# Patient Record
Sex: MALE | Race: WHITE | NOT HISPANIC OR LATINO | Employment: OTHER | URBAN - METROPOLITAN AREA
[De-identification: names, ages, dates, MRNs, and addresses within clinical notes are randomized per-mention and may not be internally consistent; named-entity substitution may affect disease eponyms.]

---

## 2017-10-11 ENCOUNTER — OFFICE VISIT (OUTPATIENT)
Dept: ORTHOPEDIC SURGERY | Facility: CLINIC | Age: 77
End: 2017-10-11

## 2017-10-11 VITALS
DIASTOLIC BLOOD PRESSURE: 83 MMHG | HEART RATE: 70 BPM | WEIGHT: 181 LBS | HEIGHT: 67 IN | SYSTOLIC BLOOD PRESSURE: 152 MMHG | BODY MASS INDEX: 28.41 KG/M2

## 2017-10-11 DIAGNOSIS — M17.12 PRIMARY OSTEOARTHRITIS OF LEFT KNEE: Primary | ICD-10-CM

## 2017-10-11 PROCEDURE — 99213 OFFICE O/P EST LOW 20 MIN: CPT | Performed by: ORTHOPAEDIC SURGERY

## 2017-10-11 NOTE — PROGRESS NOTES
[facility] [place] [facility] Clinic Note    Subjective     Chief Complaint   Patient presents with   • Left Knee - Pain     3 month follow up from [place], last seen 7/10/2017, patient had an injection at that time.         HPI    Valentino Prajapati is a 76 y.o. male. Presents today for his left knee.  He has a known history of arthritis.  Pain is intermittent, unchanged from his last visit, worsens with walking, standing, and climbing stairs.  It is associated with swelling.  The pain is sharp in quality, and moderate in severity.  Last injection provided brief relief.  That was a steroid injection.      Patient Active Problem List   Diagnosis   • Moderate obstructive sleep apnea   • Arthritis   • Essential hypertension   • Dyslipidemia   • Gout     Past Medical History:   Diagnosis Date   • Arthritis     left knee   • Deep vein thrombosis    • Essential hypertension 11/7/2016   • JESSICA on CPAP       Past Surgical History:   Procedure Laterality Date   • ELBOW PROCEDURE Left    • FRACTURE SURGERY     • VASECTOMY        Family History   Problem Relation Age of Onset   • Deep vein thrombosis Mother    • Bleeding Disorder Mother    • Deep vein thrombosis Child      Social History     Social History   • Marital status:      Spouse name: N/A   • Number of children: N/A   • Years of education: N/A     Occupational History   • Not on file.     Social History Main Topics   • Smoking status: Never Smoker   • Smokeless tobacco: Never Used   • Alcohol use No   • Drug use: No   • Sexual activity: Defer     Other Topics Concern   • Not on file     Social History Narrative      Current Outpatient Prescriptions on File Prior to Visit   Medication Sig Dispense Refill   • allopurinol (ZYLOPRIM) 100 MG tablet TK 1 T PO QD  1   • fenofibrate micronized (LOFIBRA) 134 MG capsule TK 1 C PO QD  1   • lisinopril (PRINIVIL,ZESTRIL) 10 MG tablet TK 1 T PO  QD  1   • sildenafil (REVATIO) 20 MG tablet Take 20-40 mg by mouth Take As Directed.      • tamsulosin (FLOMAX) 0.4 MG capsule 24 hr capsule TK 1 C PO QD  1   • warfarin (COUMADIN) 5 MG tablet TK 1 T PO D AS DIRECTED  0     No current facility-administered medications on file prior to visit.       Allergies   Allergen Reactions   • Penicillins         Review of Systems   Constitutional: Negative for activity change, appetite change, chills, diaphoresis, fatigue, fever and unexpected weight change.   HENT: Negative for congestion, dental problem, drooling, ear discharge, ear pain, facial swelling, hearing loss, mouth sores, nosebleeds, postnasal drip, rhinorrhea, sinus pressure, sneezing, sore throat, tinnitus, trouble swallowing and voice change.    Eyes: Negative for photophobia, pain, discharge, redness, itching and visual disturbance.   Respiratory: Negative for apnea, cough, choking, chest tightness, shortness of breath, wheezing and stridor.    Cardiovascular: Negative for chest pain, palpitations and leg swelling.   Gastrointestinal: Negative for abdominal distention, abdominal pain, anal bleeding, blood in stool, constipation, diarrhea, nausea, rectal pain and vomiting.   Endocrine: Negative for cold intolerance, heat intolerance, polydipsia, polyphagia and polyuria.   Genitourinary: Negative for decreased urine volume, difficulty urinating, dysuria, enuresis, flank pain, frequency, genital sores, hematuria and urgency.   Musculoskeletal: Positive for arthralgias. Negative for back pain, gait problem, joint swelling, myalgias, neck pain and neck stiffness.   Skin: Negative for color change, pallor, rash and wound.   Allergic/Immunologic: Negative for environmental allergies, food allergies and immunocompromised state.   Neurological: Negative for dizziness, tremors, seizures, syncope, facial asymmetry, speech difficulty, weakness, light-headedness, numbness and headaches.   Hematological: Negative for adenopathy. Does not bruise/bleed easily.   Psychiatric/Behavioral: Negative for agitation,  "behavioral problems, confusion, decreased concentration, dysphoric mood, hallucinations, self-injury, sleep disturbance and suicidal ideas. The patient is not nervous/anxious and is not hyperactive.         Objective      Physical Exam  /83  Pulse 70  Ht 67\" (170.2 cm)  Wt 181 lb (82.1 kg)  BMI 28.35 kg/m2    Body mass index is 28.35 kg/(m^2).    General:   Mental Status:  Alert   Appearance: Cooperative, in no acute distress   Build and Nutrition: Well-nourished and well developed male   Orientation: Alert and oriented to person, place and time   Posture: Normal   Gait: Normal    Integument:   Left knee: No skin lesions, no rash, no ecchymosis    Lower Extremities:   Left Knee:    Tenderness:  No medial or lateral joint line tenderness    Effusion:  None    Swelling:  None    Crepitus: Positive    Range of motion:  Extension: 0°       Flexion: 120°  Instability: No varus laxity, no valgus laxity, negative anterior drawer  Deformities:  Varus      Imaging/Studies  Previous radiographs were reviewed, which showed bone-on-bone contact medial compartment, with tricompartmental arthritic changes, and varus alignment.      Assessment and Plan     Valentino was seen today for pain.    Diagnoses and all orders for this visit:    Primary osteoarthritis of left knee        I reviewed my findings with patient today.  Symptoms are mild to moderate at current time, and he does have a clotting disorder, and certainly that would need to be taken into consideration should he be a surgical candidate in the future.  I will see him back there is any worsening or problems.  His knee is tolerable today.    Return if symptoms worsen or fail to improve, for as needed for any problems in the future.      Medical Decision Making  Data/Risk: independent visualization of imaging, lab tests, or EMG/NCV      Wenceslao Freire MD  10/11/17  10:02 AM  "

## 2018-06-20 ENCOUNTER — OFFICE VISIT (OUTPATIENT)
Dept: ORTHOPEDIC SURGERY | Facility: CLINIC | Age: 78
End: 2018-06-20

## 2018-06-20 VITALS — WEIGHT: 180.56 LBS | HEART RATE: 66 BPM | BODY MASS INDEX: 28.34 KG/M2 | HEIGHT: 67 IN | OXYGEN SATURATION: 99 %

## 2018-06-20 DIAGNOSIS — M17.12 PRIMARY OSTEOARTHRITIS OF LEFT KNEE: Primary | ICD-10-CM

## 2018-06-20 PROCEDURE — 99213 OFFICE O/P EST LOW 20 MIN: CPT | Performed by: ORTHOPAEDIC SURGERY

## 2018-06-20 NOTE — PROGRESS NOTES
[facility] [place] [facility] Note    Subjective     Chief Complaint   Patient presents with   • Left Knee - Follow-up     8 months        HPI    Valentino Prajapati is a 77 y.o. male. He presents today for evaluation of his left knee.  He has a known history of osteoarthritis, and is trying to avoid knee replacement surgery.  Minimal improvement with steroid injections in the past.  He is interested in Visco supplementation injections at this time.  He has moderate to severe pain in the left knee, associated with swelling and stiffness, which is burning and sharp in quality.  It worsens with walking, standing and climbing stairs.      Patient Active Problem List   Diagnosis   • Moderate obstructive sleep apnea   • Arthritis   • Essential hypertension   • Dyslipidemia   • Gout   • Primary osteoarthritis of left knee     Past Medical History:   Diagnosis Date   • Arthritis     left knee   • Deep vein thrombosis    • Essential hypertension 11/7/2016   • JESSICA on CPAP       Past Surgical History:   Procedure Laterality Date   • ELBOW PROCEDURE Left    • FRACTURE SURGERY     • VASECTOMY        Family History   Problem Relation Age of Onset   • Deep vein thrombosis Mother    • Bleeding Disorder Mother    • Deep vein thrombosis Child      Social History     Social History   • Marital status:      Spouse name: N/A   • Number of children: N/A   • Years of education: N/A     Occupational History   • Not on file.     Social History Main Topics   • Smoking status: Never Smoker   • Smokeless tobacco: Never Used   • Alcohol use No   • Drug use: No   • Sexual activity: Defer     Other Topics Concern   • Not on file     Social History Narrative   • No narrative on file      Current Outpatient Prescriptions on File Prior to Visit   Medication Sig Dispense Refill   • allopurinol (ZYLOPRIM) 100 MG tablet TK 1 T PO QD  1   • fenofibrate micronized (LOFIBRA) 134 MG capsule TK 1 C PO QD  1   • lisinopril (PRINIVIL,ZESTRIL) 10 MG  "tablet TK 1 T PO  QD  1   • sildenafil (REVATIO) 20 MG tablet Take 20-40 mg by mouth Take As Directed.     • tamsulosin (FLOMAX) 0.4 MG capsule 24 hr capsule TK 1 C PO QD  1   • warfarin (COUMADIN) 5 MG tablet TK 1 T PO D AS DIRECTED  0     No current facility-administered medications on file prior to visit.       Allergies   Allergen Reactions   • Penicillins         Review of Systems   Constitutional: Negative.    HENT: Positive for sinus pressure.    Eyes: Negative.    Respiratory: Negative.    Cardiovascular: Negative.    Gastrointestinal: Negative.    Endocrine: Negative.    Genitourinary: Negative.    Musculoskeletal: Positive for arthralgias.   Skin: Negative.    Allergic/Immunologic: Positive for environmental allergies.   Neurological: Negative.    Hematological: Bruises/bleeds easily.   Psychiatric/Behavioral: Negative.         Objective      Physical Exam  Pulse 66   Ht 170.2 cm (67.01\")   Wt 81.9 kg (180 lb 8.9 oz)   SpO2 99%   BMI 28.27 kg/m²     Body mass index is 28.27 kg/m².    General:   Mental Status:  Alert   Appearance: Cooperative, in no acute distress   Build and Nutrition: Well-nourished and well developed male   Orientation: Alert and oriented to person, place and time   Posture: Normal   Gait: Normal    Integument:   Left knee: No skin lesions, no rash, no ecchymosis    Lower Extremities:   Left Knee:    Tenderness:  None    Effusion:  None    Swelling:  None    Crepitus: Positive    Range of motion:  Extension: 5°       Flexion: 120°  Instability: No varus laxity, no valgus laxity, negative anterior drawer  Deformities:  Varus        Imaging/Studies  Imaging Results (last 24 hours)     Procedure Component Value Units Date/Time    XR Knee 4+ View Left [[id number]] Resulted:  06/20/18 0923     Updated:  06/20/18 0924    Narrative:       Left Knee Radiographs  Indication: left knee pain  Views: Standing AP's and skiers of both knees, with lateral and sunrise   views of the left " knee    Comparison: no prior studies available    Findings:   Bone-on-bone contact medial compartment, with varus alignment, and   tricompartmental osteophytes, significant patellofemoral degenerative   changes.    Impression: Advanced left knee arthritis.            Assessment and Plan     Valentino was seen today for follow-up.    Diagnoses and all orders for this visit:    Primary osteoarthritis of left knee  -     XR Knee 4+ View Left  -     Large Joint Arthrocentesis        I reviewed my findings with patient today.  He has advanced left knee arthritis, but has a significant clotting disorder.  He is not interested in surgical management at this time.  He would like to try a Visco supplementation injection series.  I discussed that when he has advanced arthritis with bone-on-bone contact, these do not work as well as if he did not.  However, because our options are limited, he would like to go ahead and proceed.  We will submit for approval.  I will see him back once the injections are ready for administration.    Return for After approval of the visco injection.      Medical Decision Making  Management Options : prescription/IM medicine  Data/Risk: radiology tests and independent visualization of imaging, lab tests, or EMG/NCV      Wenceslao Freire MD  06/20/18  9:33 AM

## 2018-07-18 ENCOUNTER — CLINICAL SUPPORT (OUTPATIENT)
Dept: ORTHOPEDIC SURGERY | Facility: CLINIC | Age: 78
End: 2018-07-18

## 2018-07-18 DIAGNOSIS — M17.12 PRIMARY OSTEOARTHRITIS OF LEFT KNEE: Primary | ICD-10-CM

## 2018-07-18 PROCEDURE — 20610 DRAIN/INJ JOINT/BURSA W/O US: CPT | Performed by: ORTHOPAEDIC SURGERY

## 2018-07-18 NOTE — PROGRESS NOTES
Procedure   Large Joint Arthrocentesis  Date/Time: 7/18/2018 9:26 AM  Consent given by: patient  Site marked: site marked  Timeout: Immediately prior to procedure a time out was called to verify the correct patient, procedure, equipment, support staff and site/side marked as required   Supporting Documentation  Indications: pain   Procedure Details  Location: knee - L knee  Preparation: Patient was prepped and draped in the usual sterile fashion  Needle size: 22 G  Approach: anterolateral  Medications administered: 30 mg Hyaluronan 30 MG/2ML  Patient tolerance: patient tolerated the procedure well with no immediate complications

## 2018-07-18 NOTE — PROGRESS NOTES
Procedure   Large Joint Arthrocentesis  Date/Time: 7/18/2018 9:00 AM  Consent given by: patient  Site marked: site marked  Timeout: Immediately prior to procedure a time out was called to verify the correct patient, procedure, equipment, support staff and site/side marked as required   Supporting Documentation  Indications: pain   Procedure Details  Location: knee - L knee  Preparation: Patient was prepped and draped in the usual sterile fashion  Needle size: 22 G  Approach: anterolateral  Medications administered: 30 mg Hyaluronan 30 MG/2ML  Patient tolerance: patient tolerated the procedure well with no immediate complications

## 2018-07-18 NOTE — PROGRESS NOTES
[facility] [place] [facility] Note    Subjective     Chief Complaint   Patient presents with   • Left Knee - Follow-up     Orthovisc injection #1        HPI    Valentino Prajapati is a 77 y.o. male who presents for the first Orthovisc injection into the left knee joint.    Patient Active Problem List   Diagnosis   • Moderate obstructive sleep apnea   • Arthritis   • Essential hypertension   • Dyslipidemia   • Gout   • Primary osteoarthritis of left knee     Past Medical History:   Diagnosis Date   • Arthritis     left knee   • Deep vein thrombosis (CMS/HCC)    • Essential hypertension 11/7/2016   • JESSICA on CPAP       Past Surgical History:   Procedure Laterality Date   • ELBOW PROCEDURE Left    • FRACTURE SURGERY     • VASECTOMY        Family History   Problem Relation Age of Onset   • Deep vein thrombosis Mother    • Bleeding Disorder Mother    • Deep vein thrombosis Child      Social History     Social History   • Marital status:      Spouse name: N/A   • Number of children: N/A   • Years of education: N/A     Occupational History   • Not on file.     Social History Main Topics   • Smoking status: Never Smoker   • Smokeless tobacco: Never Used   • Alcohol use No   • Drug use: No   • Sexual activity: Defer     Other Topics Concern   • Not on file     Social History Narrative   • No narrative on file      Current Outpatient Prescriptions on File Prior to Visit   Medication Sig Dispense Refill   • allopurinol (ZYLOPRIM) 100 MG tablet TK 1 T PO QD  1   • fenofibrate micronized (LOFIBRA) 134 MG capsule TK 1 C PO QD  1   • lisinopril (PRINIVIL,ZESTRIL) 10 MG tablet TK 1 T PO  QD  1   • sildenafil (REVATIO) 20 MG tablet Take 20-40 mg by mouth Take As Directed.     • tamsulosin (FLOMAX) 0.4 MG capsule 24 hr capsule TK 1 C PO QD  1   • warfarin (COUMADIN) 5 MG tablet TK 1 T PO D AS DIRECTED  0     No current facility-administered medications on file prior to visit.       Allergies   Allergen Reactions   •  Penicillins         Review of Systems   Constitutional: Negative for activity change, appetite change, chills, diaphoresis, fatigue, fever and unexpected weight change.   HENT: Negative for congestion, dental problem, drooling, ear discharge, ear pain, facial swelling, hearing loss, mouth sores, nosebleeds, postnasal drip, rhinorrhea, sinus pressure, sneezing, sore throat, tinnitus, trouble swallowing and voice change.    Eyes: Negative for photophobia, pain, discharge, redness, itching and visual disturbance.   Respiratory: Negative for apnea, cough, choking, chest tightness, shortness of breath, wheezing and stridor.    Cardiovascular: Negative for chest pain, palpitations and leg swelling.   Gastrointestinal: Negative for abdominal distention, abdominal pain, anal bleeding, blood in stool, constipation, diarrhea, nausea, rectal pain and vomiting.   Endocrine: Negative for cold intolerance, heat intolerance, polydipsia, polyphagia and polyuria.   Genitourinary: Negative for decreased urine volume, difficulty urinating, dysuria, enuresis, flank pain, frequency, genital sores, hematuria and urgency.   Musculoskeletal: Positive for arthralgias. Negative for back pain, gait problem, joint swelling, myalgias, neck pain and neck stiffness.   Skin: Negative for color change, pallor, rash and wound.   Allergic/Immunologic: Negative for environmental allergies, food allergies and immunocompromised state.   Neurological: Negative for dizziness, tremors, seizures, syncope, facial asymmetry, speech difficulty, weakness, light-headedness, numbness and headaches.   Hematological: Negative for adenopathy. Does not bruise/bleed easily.   Psychiatric/Behavioral: Negative for agitation, behavioral problems, confusion, decreased concentration, dysphoric mood, hallucinations, self-injury, sleep disturbance and suicidal ideas. The patient is not nervous/anxious and is not hyperactive.         Objective      Physical Exam  There were  no vitals taken for this visit.    There is no height or weight on file to calculate BMI.    General:   Mental Status:  Alert   Appearance: Cooperative, in no acute distress   Posture: Normal    Assessment and Plan     Valentino was seen today for follow-up.    Diagnoses and all orders for this visit:    Primary osteoarthritis of left knee        Administration of viscosupplementation today.  Please see my procedure note for details.    Return in about 1 week (around 7/25/2018) for Injection.    Wenceslao Freire MD  07/18/18  9:35 AM

## 2018-07-25 ENCOUNTER — CLINICAL SUPPORT (OUTPATIENT)
Dept: ORTHOPEDIC SURGERY | Facility: CLINIC | Age: 78
End: 2018-07-25

## 2018-07-25 DIAGNOSIS — M17.12 PRIMARY OSTEOARTHRITIS OF LEFT KNEE: Primary | ICD-10-CM

## 2018-07-25 PROCEDURE — 20610 DRAIN/INJ JOINT/BURSA W/O US: CPT | Performed by: ORTHOPAEDIC SURGERY

## 2018-07-25 NOTE — PROGRESS NOTES
[facility] [place] Orthopa[facility] Surgery Clinic Note    Subjective     Chief Complaint   Patient presents with   • Left Knee - Follow-up        HPI    Valentino Prajapati is a 77 y.o. male who presents for the second Orthovisc injection into the left knee joint.  Of note, he has seen some relief at this point.    Patient Active Problem List   Diagnosis   • Moderate obstructive sleep apnea   • Arthritis   • Essential hypertension   • Dyslipidemia   • Gout   • Primary osteoarthritis of left knee     Past Medical History:   Diagnosis Date   • Arthritis     left knee   • Deep vein thrombosis (CMS/HCC)    • Essential hypertension 11/7/2016   • JESSICA on CPAP       Past Surgical History:   Procedure Laterality Date   • ELBOW PROCEDURE Left    • FRACTURE SURGERY     • VASECTOMY        Family History   Problem Relation Age of Onset   • Deep vein thrombosis Mother    • Bleeding Disorder Mother    • Deep vein thrombosis Child      Social History     Social History   • Marital status:      Spouse name: N/A   • Number of children: N/A   • Years of education: N/A     Occupational History   • Not on file.     Social History Main Topics   • Smoking status: Never Smoker   • Smokeless tobacco: Never Used   • Alcohol use No   • Drug use: No   • Sexual activity: Defer     Other Topics Concern   • Not on file     Social History Narrative   • No narrative on file      Current Outpatient Prescriptions on File Prior to Visit   Medication Sig Dispense Refill   • allopurinol (ZYLOPRIM) 100 MG tablet TK 1 T PO QD  1   • fenofibrate micronized (LOFIBRA) 134 MG capsule TK 1 C PO QD  1   • lisinopril (PRINIVIL,ZESTRIL) 10 MG tablet TK 1 T PO  QD  1   • sildenafil (REVATIO) 20 MG tablet Take 20-40 mg by mouth Take As Directed.     • tamsulosin (FLOMAX) 0.4 MG capsule 24 hr capsule TK 1 C PO QD  1   • warfarin (COUMADIN) 5 MG tablet TK 1 T PO D AS DIRECTED  0     No current facility-administered medications on file prior to visit.       Allergies   Allergen  Reactions   • Penicillins         Review of Systems   Constitutional: Negative.    HENT: Negative.    Eyes: Negative.    Respiratory: Negative.    Cardiovascular: Negative.    Gastrointestinal: Negative.    Endocrine: Negative.    Genitourinary: Negative.    Musculoskeletal: Positive for arthralgias.   Skin: Negative.    Allergic/Immunologic: Negative.    Neurological: Negative.    Hematological: Negative.    Psychiatric/Behavioral: Negative.         Objective      Physical Exam  There were no vitals taken for this visit.    There is no height or weight on file to calculate BMI.    General:   Mental Status:  Alert   Appearance: Cooperative, in no acute distress   Posture: Normal    Assessment and Plan     Valentino was seen today for follow-up.    Diagnoses and all orders for this visit:    Primary osteoarthritis of left knee  -     Large Joint Arthrocentesis        Administration of viscosupplementation today.  Please see my procedure note for details.    Return in about 1 week (around 8/1/2018) for Injection.    Wenceslao Freire MD  07/25/18  9:04 AM

## 2018-07-25 NOTE — PROGRESS NOTES
Procedure   Large Joint Arthrocentesis  Date/Time: 7/25/2018 8:51 AM  Consent given by: patient  Timeout: Immediately prior to procedure a time out was called to verify the correct patient, procedure, equipment, support staff and site/side marked as required   Supporting Documentation  Indications: pain   Procedure Details  Location: knee - L knee  Preparation: Patient was prepped and draped in the usual sterile fashion  Needle size: 22 G  Approach: anterolateral  Medications administered: 30 mg Hyaluronan 30 MG/2ML

## 2018-08-08 ENCOUNTER — CLINICAL SUPPORT (OUTPATIENT)
Dept: ORTHOPEDIC SURGERY | Facility: CLINIC | Age: 78
End: 2018-08-08

## 2018-08-08 DIAGNOSIS — M17.12 PRIMARY OSTEOARTHRITIS OF LEFT KNEE: Primary | ICD-10-CM

## 2018-08-08 PROCEDURE — 20610 DRAIN/INJ JOINT/BURSA W/O US: CPT | Performed by: ORTHOPAEDIC SURGERY

## 2018-08-08 NOTE — PROGRESS NOTES
[facility] [place] [facility] [place]    Subjective     Chief Complaint   Patient presents with   • Left Knee - Follow-up     Primary Osteoarthritis of Left Knee; Orthovisc Injection #3        HPI    Valentino Prajapati is a 77 y.o. male who presents for the third Orthovisc injection into the left knee joint.  Of note, he has seen some improvement so far.    Patient Active Problem List   Diagnosis   • Moderate obstructive sleep apnea   • Arthritis   • Essential hypertension   • Dyslipidemia   • Gout   • Primary osteoarthritis of left knee     Past Medical History:   Diagnosis Date   • Arthritis     left knee   • Deep vein thrombosis (CMS/HCC)    • Essential hypertension 11/7/2016   • JESSICA on CPAP       Past Surgical History:   Procedure Laterality Date   • ELBOW PROCEDURE Left    • FRACTURE SURGERY     • VASECTOMY        Family History   Problem Relation Age of Onset   • Deep vein thrombosis Mother    • Bleeding Disorder Mother    • Deep vein thrombosis Child      Social History     Social History   • Marital status:      Spouse name: N/A   • Number of children: N/A   • Years of education: N/A     Occupational History   • Not on file.     Social History Main Topics   • Smoking status: Never Smoker   • Smokeless tobacco: Never Used   • Alcohol use No   • Drug use: No   • Sexual activity: Defer     Other Topics Concern   • Not on file     Social History Narrative   • No narrative on file      Current Outpatient Prescriptions on File Prior to Visit   Medication Sig Dispense Refill   • allopurinol (ZYLOPRIM) 100 MG tablet TK 1 T PO QD  1   • fenofibrate micronized (LOFIBRA) 134 MG capsule TK 1 C PO QD  1   • lisinopril (PRINIVIL,ZESTRIL) 10 MG tablet TK 1 T PO  QD  1   • sildenafil (REVATIO) 20 MG tablet Take 20-40 mg by mouth Take As Directed.     • tamsulosin (FLOMAX) 0.4 MG capsule 24 hr capsule TK 1 C PO QD  1   • warfarin (COUMADIN) 5 MG tablet TK 1 T PO D AS DIRECTED  0     No current facility-administered  medications on file prior to visit.       Allergies   Allergen Reactions   • Penicillins         Review of Systems   Constitutional: Negative for activity change, appetite change, chills, diaphoresis, fatigue, fever and unexpected weight change.   HENT: Negative for congestion, dental problem, drooling, ear discharge, ear pain, facial swelling, hearing loss, mouth sores, nosebleeds, postnasal drip, rhinorrhea, sinus pressure, sneezing, sore throat, tinnitus, trouble swallowing and voice change.    Eyes: Negative for photophobia, pain, discharge, redness, itching and visual disturbance.   Respiratory: Negative for apnea, cough, choking, chest tightness, shortness of breath, wheezing and stridor.    Cardiovascular: Negative for chest pain, palpitations and leg swelling.   Gastrointestinal: Negative for abdominal distention, abdominal pain, anal bleeding, blood in stool, constipation, diarrhea, nausea, rectal pain and vomiting.   Endocrine: Negative for cold intolerance, heat intolerance, polydipsia, polyphagia and polyuria.   Genitourinary: Negative for decreased urine volume, difficulty urinating, dysuria, enuresis, flank pain, frequency, genital sores, hematuria and urgency.   Musculoskeletal: Positive for joint swelling. Negative for arthralgias, back pain, gait problem, myalgias, neck pain and neck stiffness.   Skin: Negative for color change, pallor, rash and wound.   Allergic/Immunologic: Negative for environmental allergies, food allergies and immunocompromised state.   Neurological: Negative for dizziness, tremors, seizures, syncope, facial asymmetry, speech difficulty, weakness, light-headedness, numbness and headaches.   Hematological: Negative for adenopathy. Does not bruise/bleed easily.   Psychiatric/Behavioral: Negative for agitation, behavioral problems, confusion, decreased concentration, dysphoric mood, hallucinations, self-injury, sleep disturbance and suicidal ideas. The patient is not  nervous/anxious and is not hyperactive.         Objective      Physical Exam  There were no vitals taken for this visit.    There is no height or weight on file to calculate BMI.    General:   Mental Status:  Alert   Appearance: Cooperative, in no acute distress   Posture: Normal    Assessment and Plan     Valentino was seen today for follow-up.    Diagnoses and all orders for this visit:    Primary osteoarthritis of left knee  -     Large Joint Arthrocentesis        Administration of viscosupplementation today.  Please see my procedure note for details.    Return in about 6 months (around 2/8/2019).    Wenceslao Freire MD  08/08/18  9:03 AM

## 2018-08-08 NOTE — PROGRESS NOTES
Procedure   Large Joint Arthrocentesis  Date/Time: 8/8/2018 8:54 AM  Consent given by: patient  Site marked: site marked  Timeout: Immediately prior to procedure a time out was called to verify the correct patient, procedure, equipment, support staff and site/side marked as required   Supporting Documentation  Indications: pain   Procedure Details  Location: knee - L knee  Preparation: Patient was prepped and draped in the usual sterile fashion  Needle size: 22 G  Approach: anterolateral  Medications administered: 30 mg Hyaluronan 30 MG/2ML  Patient tolerance: patient tolerated the procedure well with no immediate complications

## 2019-02-11 ENCOUNTER — OFFICE VISIT (OUTPATIENT)
Dept: ORTHOPEDIC SURGERY | Facility: CLINIC | Age: 79
End: 2019-02-11

## 2019-02-11 VITALS — HEART RATE: 76 BPM | WEIGHT: 185.19 LBS | OXYGEN SATURATION: 98 % | HEIGHT: 67 IN | BODY MASS INDEX: 29.07 KG/M2

## 2019-02-11 DIAGNOSIS — M17.12 PRIMARY OSTEOARTHRITIS OF LEFT KNEE: Primary | ICD-10-CM

## 2019-02-11 PROCEDURE — 99213 OFFICE O/P EST LOW 20 MIN: CPT | Performed by: ORTHOPAEDIC SURGERY

## 2019-02-11 RX ORDER — WARFARIN SODIUM 3 MG/1
3 TABLET ORAL DAILY
Refills: 0 | COMMUNITY
Start: 2018-12-27

## 2019-02-11 RX ORDER — LISINOPRIL 20 MG/1
20 TABLET ORAL DAILY
Refills: 1 | COMMUNITY
Start: 2018-12-07

## 2019-02-11 NOTE — PROGRESS NOTES
[facility] [place] [facility] [place]    Subjective     Chief Complaint   Patient presents with   • Follow-up     6 months following visco injection - left knee         HPI    Valentino Prajapati is a 78 y.o. male.  He follows up today for his left knee.  He has been experiencing pain for 5 years and the left knee, it overall is improved following the Visco supplementation injections last year, but is starting to wear off, and he would like repeat injections if possible.  The pain is worse with walking, standing and climbing stairs.  The pain is currently 5 out of 10, aching in quality.  He has a clotting disorder, and is not interested in surgical intervention.      Patient Active Problem List   Diagnosis   • Moderate obstructive sleep apnea   • Arthritis   • Essential hypertension   • Dyslipidemia   • Gout   • Primary osteoarthritis of left knee     Past Medical History:   Diagnosis Date   • Arthritis     left knee   • Deep vein thrombosis (CMS/HCC)    • Essential hypertension 11/7/2016   • JESSICA on CPAP       Past Surgical History:   Procedure Laterality Date   • ELBOW PROCEDURE Left    • FRACTURE SURGERY     • VASECTOMY        Family History   Problem Relation Age of Onset   • Deep vein thrombosis Mother    • Bleeding Disorder Mother    • Deep vein thrombosis Child      Social History     Socioeconomic History   • Marital status:      Spouse name: Not on file   • Number of children: Not on file   • Years of education: Not on file   • Highest education level: Not on file   Social Needs   • Financial resource strain: Not on file   • Food insecurity - worry: Not on file   • Food insecurity - inability: Not on file   • Transportation needs - medical: Not on file   • Transportation needs - non-medical: Not on file   Occupational History   • Not on file   Tobacco Use   • Smoking status: Never Smoker   • Smokeless tobacco: Never Used   Substance and Sexual Activity   • Alcohol use: No   • Drug use: No   • Sexual  activity: Defer   Other Topics Concern   • Not on file   Social History Narrative   • Not on file      Current Outpatient Medications on File Prior to Visit   Medication Sig Dispense Refill   • allopurinol (ZYLOPRIM) 100 MG tablet TK 1 T PO QD  1   • fenofibrate micronized (LOFIBRA) 134 MG capsule TK 1 C PO QD  1   • lisinopril (PRINIVIL,ZESTRIL) 20 MG tablet Take 20 mg by mouth Daily.  1   • sildenafil (REVATIO) 20 MG tablet Take 20-40 mg by mouth Take As Directed.     • tamsulosin (FLOMAX) 0.4 MG capsule 24 hr capsule TK 1 C PO QD  1   • warfarin (COUMADIN) 3 MG tablet Take 3 mg by mouth Daily.  0   • [DISCONTINUED] lisinopril (PRINIVIL,ZESTRIL) 10 MG tablet TK 1 T PO  QD  1   • [DISCONTINUED] warfarin (COUMADIN) 5 MG tablet TK 1 T PO D AS DIRECTED  0     No current facility-administered medications on file prior to visit.       Allergies   Allergen Reactions   • Penicillins         Review of Systems   Constitutional: Negative for activity change, appetite change, chills, diaphoresis, fatigue, fever and unexpected weight change.   HENT: Negative for congestion, dental problem, drooling, ear discharge, ear pain, facial swelling, hearing loss, mouth sores, nosebleeds, postnasal drip, rhinorrhea, sinus pressure, sneezing, sore throat, tinnitus, trouble swallowing and voice change.    Eyes: Negative for photophobia, pain, discharge, redness, itching and visual disturbance.   Respiratory: Negative for apnea, cough, choking, chest tightness, shortness of breath, wheezing and stridor.    Cardiovascular: Negative for chest pain, palpitations and leg swelling.   Gastrointestinal: Negative for abdominal distention, abdominal pain, anal bleeding, blood in stool, constipation, diarrhea, nausea, rectal pain and vomiting.   Endocrine: Negative for cold intolerance, heat intolerance, polydipsia, polyphagia and polyuria.   Genitourinary: Negative for decreased urine volume, difficulty urinating, dysuria, enuresis, flank pain,  "frequency, genital sores, hematuria and urgency.   Musculoskeletal: Negative for arthralgias, back pain, gait problem, joint swelling, myalgias, neck pain and neck stiffness.        Primary osteoarthritis of left knee      Skin: Negative for color change, pallor, rash and wound.   Allergic/Immunologic: Negative for environmental allergies, food allergies and immunocompromised state.   Neurological: Negative for dizziness, tremors, seizures, syncope, facial asymmetry, speech difficulty, weakness, light-headedness, numbness and headaches.   Hematological: Negative for adenopathy. Does not bruise/bleed easily.   Psychiatric/Behavioral: Negative for agitation, behavioral problems, confusion, decreased concentration, dysphoric mood, hallucinations, self-injury, sleep disturbance and suicidal ideas. The patient is not nervous/anxious and is not hyperactive.         Objective      Physical Exam  Pulse 76   Ht 170.2 cm (67.01\")   Wt 84 kg (185 lb 3 oz)   SpO2 98%   BMI 29.00 kg/m²     Body mass index is 29 kg/m².    General:   Mental Status:  Alert   Appearance: Cooperative, in no acute distress   Build and Nutrition: Well-nourished and well developed male   Orientation: Alert and oriented to person, place and time   Posture: Normal   Gait: Normal    Integument:   Left knee: No skin lesions, no rash, no ecchymosis    Lower Extremities:   Left Knee:    Tenderness:  None    Effusion:  None    Swelling:  None    Crepitus: Positive    Range of motion:  Extension: 0°       Flexion: 120°  Instability: No varus laxity, no valgus laxity, negative anterior drawer  Deformities:  Varus        Imaging/Studies  Imaging Results (last 24 hours)     Procedure Component Value Units Date/Time    XR Knee 4+ View Left [[id number]] Resulted:  02/11/19 0921     Updated:  02/11/19 0922    Narrative:       Left Knee Radiographs  Indication: left knee pain  Views: Standing AP's and skiers of both knees, with lateral and sunrise   views of the " left knee    Comparison: 6/20/2018    Findings:   Arthritic changes in the left knee, with bone-on-bone contact medial   compartment, tricompartmental osteophytes, varus alignment, and advanced   patellofemoral degeneration, no significant changes compared to previous   films.            Assessment and Plan     Valentino was seen today for follow-up.    Diagnoses and all orders for this visit:    Primary osteoarthritis of left knee  -     XR Knee 4+ View Left  -     Large Joint Arthrocentesis        I reviewed my findings with patient today.  His left knee continues to bother him, although he did have good relief with the Visco supplementation injections.  He would like a repeat series, and we will submit for approval.  He has a clotting disorder, and is not interested in surgical intervention.    Return for After approval of the visco injection.      Medical Decision Making  Management Options : prescription/IM medicine  Data/Risk: radiology tests and independent visualization of imaging, lab tests, or EMG/NCV      Wenceslao Freire MD  02/11/19  9:29 AM

## 2019-02-20 ENCOUNTER — CLINICAL SUPPORT (OUTPATIENT)
Dept: ORTHOPEDIC SURGERY | Facility: CLINIC | Age: 79
End: 2019-02-20

## 2019-02-20 DIAGNOSIS — M17.12 PRIMARY OSTEOARTHRITIS OF LEFT KNEE: Primary | ICD-10-CM

## 2019-02-20 PROCEDURE — 20610 DRAIN/INJ JOINT/BURSA W/O US: CPT | Performed by: ORTHOPAEDIC SURGERY

## 2019-02-20 NOTE — PROGRESS NOTES
Procedure   Large Joint Arthrocentesis: L knee  Date/Time: 2/20/2019 10:00 AM  Consent given by: patient  Site marked: site marked  Timeout: Immediately prior to procedure a time out was called to verify the correct patient, procedure, equipment, support staff and site/side marked as required   Supporting Documentation  Indications: pain   Procedure Details  Location: knee - L knee  Preparation: Patient was prepped and draped in the usual sterile fashion  Needle size: 22 G  Approach: anterolateral  Medications administered: 30 mg Hyaluronan 30 MG/2ML  Patient tolerance: patient tolerated the procedure well with no immediate complications

## 2019-02-20 NOTE — PROGRESS NOTES
[facility] [place] [facility] [place]    Subjective     Chief Complaint   Patient presents with   • Injections     Left knee Orthovisc injection #1        HPI    Valentino Prajapati is a 78 y.o. male who presents for the first Orthovisc injection into the left knee.    Patient Active Problem List   Diagnosis   • Moderate obstructive sleep apnea   • Arthritis   • Essential hypertension   • Dyslipidemia   • Gout   • Primary osteoarthritis of left knee     Past Medical History:   Diagnosis Date   • Arthritis     left knee   • Deep vein thrombosis (CMS/HCC)    • Essential hypertension 11/7/2016   • JESSICA on CPAP       Past Surgical History:   Procedure Laterality Date   • ELBOW PROCEDURE Left    • FRACTURE SURGERY     • VASECTOMY        Family History   Problem Relation Age of Onset   • Deep vein thrombosis Mother    • Bleeding Disorder Mother    • Deep vein thrombosis Child      Social History     Socioeconomic History   • Marital status:      Spouse name: Not on file   • Number of children: Not on file   • Years of education: Not on file   • Highest education level: Not on file   Social Needs   • Financial resource strain: Not on file   • Food insecurity - worry: Not on file   • Food insecurity - inability: Not on file   • Transportation needs - medical: Not on file   • Transportation needs - non-medical: Not on file   Occupational History   • Not on file   Tobacco Use   • Smoking status: Never Smoker   • Smokeless tobacco: Never Used   Substance and Sexual Activity   • Alcohol use: No   • Drug use: No   • Sexual activity: Defer   Other Topics Concern   • Not on file   Social History Narrative   • Not on file      Current Outpatient Medications on File Prior to Visit   Medication Sig Dispense Refill   • allopurinol (ZYLOPRIM) 100 MG tablet TK 1 T PO QD  1   • fenofibrate micronized (LOFIBRA) 134 MG capsule TK 1 C PO QD  1   • lisinopril (PRINIVIL,ZESTRIL) 20 MG tablet Take 20 mg by mouth Daily.  1   • sildenafil  (REVATIO) 20 MG tablet Take 20-40 mg by mouth Take As Directed.     • tamsulosin (FLOMAX) 0.4 MG capsule 24 hr capsule TK 1 C PO QD  1   • warfarin (COUMADIN) 3 MG tablet Take 3 mg by mouth Daily.  0     No current facility-administered medications on file prior to visit.       Allergies   Allergen Reactions   • Penicillins         Review of Systems   Constitutional: Negative for activity change, appetite change, chills, diaphoresis, fatigue, fever and unexpected weight change.   HENT: Negative for congestion, dental problem, drooling, ear discharge, ear pain, facial swelling, hearing loss, mouth sores, nosebleeds, postnasal drip, rhinorrhea, sinus pressure, sneezing, sore throat, tinnitus, trouble swallowing and voice change.    Eyes: Negative for photophobia, pain, discharge, redness, itching and visual disturbance.   Respiratory: Negative for apnea, cough, choking, chest tightness, shortness of breath, wheezing and stridor.    Cardiovascular: Negative for chest pain, palpitations and leg swelling.   Gastrointestinal: Negative for abdominal distention, abdominal pain, anal bleeding, blood in stool, constipation, diarrhea, nausea, rectal pain and vomiting.   Endocrine: Negative for cold intolerance, heat intolerance, polydipsia, polyphagia and polyuria.   Genitourinary: Negative for decreased urine volume, difficulty urinating, dysuria, enuresis, flank pain, frequency, genital sores, hematuria and urgency.   Musculoskeletal: Positive for arthralgias (Knee pain). Negative for back pain, gait problem, joint swelling, myalgias, neck pain and neck stiffness.   Skin: Negative for color change, pallor, rash and wound.   Allergic/Immunologic: Negative for environmental allergies, food allergies and immunocompromised state.   Neurological: Negative for dizziness, tremors, seizures, syncope, facial asymmetry, speech difficulty, weakness, light-headedness, numbness and headaches.   Hematological: Negative for adenopathy.  Does not bruise/bleed easily.   Psychiatric/Behavioral: Negative for agitation, behavioral problems, confusion, decreased concentration, dysphoric mood, hallucinations, self-injury, sleep disturbance and suicidal ideas. The patient is not nervous/anxious and is not hyperactive.         Objective      Physical Exam  There were no vitals taken for this visit.    There is no height or weight on file to calculate BMI.    General:   Mental Status:  Alert   Appearance: Cooperative, in no acute distress   Posture: Normal    Assessment and Plan     Valentino was seen today for injections.    Diagnoses and all orders for this visit:    Primary osteoarthritis of left knee    Large Joint Arthrocentesis: L knee    Administration of viscosupplementation today.  Please see my procedure note for details.    Return in about 1 week (around 2/27/2019) for Injection.    Wenceslao Freire MD  02/20/19  10:27 AM

## 2019-02-27 ENCOUNTER — CLINICAL SUPPORT (OUTPATIENT)
Dept: ORTHOPEDIC SURGERY | Facility: CLINIC | Age: 79
End: 2019-02-27

## 2019-02-27 DIAGNOSIS — M17.12 PRIMARY OSTEOARTHRITIS OF LEFT KNEE: Primary | ICD-10-CM

## 2019-02-27 PROCEDURE — 20610 DRAIN/INJ JOINT/BURSA W/O US: CPT | Performed by: ORTHOPAEDIC SURGERY

## 2019-02-27 NOTE — PROGRESS NOTES
[facility] [place] [facility] [place]    Subjective     Chief Complaint   Patient presents with   • Injections     Left knee Orthovisc injection #2        HPI    Valentino Prajapati is a 78 y.o. male who presents for the second Orthovisc injection in the right knee.  Of note, he did have some relief with the first injection.    Patient Active Problem List   Diagnosis   • Moderate obstructive sleep apnea   • Arthritis   • Essential hypertension   • Dyslipidemia   • Gout   • Primary osteoarthritis of left knee     Past Medical History:   Diagnosis Date   • Arthritis     left knee   • Deep vein thrombosis (CMS/HCC)    • Essential hypertension 11/7/2016   • JESSICA on CPAP       Past Surgical History:   Procedure Laterality Date   • ELBOW PROCEDURE Left    • FRACTURE SURGERY     • VASECTOMY        Family History   Problem Relation Age of Onset   • Deep vein thrombosis Mother    • Bleeding Disorder Mother    • Deep vein thrombosis Child      Social History     Socioeconomic History   • Marital status:      Spouse name: Not on file   • Number of children: Not on file   • Years of education: Not on file   • Highest education level: Not on file   Social Needs   • Financial resource strain: Not on file   • Food insecurity - worry: Not on file   • Food insecurity - inability: Not on file   • Transportation needs - medical: Not on file   • Transportation needs - non-medical: Not on file   Occupational History   • Not on file   Tobacco Use   • Smoking status: Never Smoker   • Smokeless tobacco: Never Used   Substance and Sexual Activity   • Alcohol use: No   • Drug use: No   • Sexual activity: Defer   Other Topics Concern   • Not on file   Social History Narrative   • Not on file      Current Outpatient Medications on File Prior to Visit   Medication Sig Dispense Refill   • allopurinol (ZYLOPRIM) 100 MG tablet TK 1 T PO QD  1   • fenofibrate micronized (LOFIBRA) 134 MG capsule TK 1 C PO QD  1   • lisinopril  (PRINIVIL,ZESTRIL) 20 MG tablet Take 20 mg by mouth Daily.  1   • sildenafil (REVATIO) 20 MG tablet Take 20-40 mg by mouth Take As Directed.     • tamsulosin (FLOMAX) 0.4 MG capsule 24 hr capsule TK 1 C PO QD  1   • warfarin (COUMADIN) 3 MG tablet Take 3 mg by mouth Daily.  0     No current facility-administered medications on file prior to visit.       Allergies   Allergen Reactions   • Penicillins         Review of Systems   Constitutional: Negative.  Negative for activity change, appetite change, chills, diaphoresis, fatigue, fever and unexpected weight change.   HENT: Negative.  Negative for congestion, dental problem, drooling, ear discharge, ear pain, facial swelling, hearing loss, mouth sores, nosebleeds, postnasal drip, rhinorrhea, sinus pressure, sneezing, sore throat, tinnitus, trouble swallowing and voice change.    Eyes: Negative.  Negative for photophobia, pain, discharge, redness, itching and visual disturbance.   Respiratory: Negative.  Negative for apnea, cough, choking, chest tightness, shortness of breath, wheezing and stridor.    Cardiovascular: Negative.  Negative for chest pain, palpitations and leg swelling.   Gastrointestinal: Negative.  Negative for abdominal distention, abdominal pain, anal bleeding, blood in stool, constipation, diarrhea, nausea, rectal pain and vomiting.   Endocrine: Negative.  Negative for cold intolerance, heat intolerance, polydipsia, polyphagia and polyuria.   Genitourinary: Negative.  Negative for decreased urine volume, difficulty urinating, dysuria, enuresis, flank pain, frequency, genital sores, hematuria and urgency.   Musculoskeletal: Positive for arthralgias. Negative for back pain, gait problem, joint swelling, myalgias, neck pain and neck stiffness.   Skin: Negative.  Negative for color change, pallor, rash and wound.   Allergic/Immunologic: Negative.  Negative for environmental allergies, food allergies and immunocompromised state.   Neurological: Negative.   Negative for dizziness, tremors, seizures, syncope, facial asymmetry, speech difficulty, weakness, light-headedness, numbness and headaches.   Hematological: Negative.  Negative for adenopathy. Does not bruise/bleed easily.   Psychiatric/Behavioral: Negative.  Negative for agitation, behavioral problems, confusion, decreased concentration, dysphoric mood, hallucinations, self-injury, sleep disturbance and suicidal ideas. The patient is not nervous/anxious and is not hyperactive.         Objective      Physical Exam  There were no vitals taken for this visit.    There is no height or weight on file to calculate BMI.    General:   Mental Status:  Alert   Appearance: Cooperative, in no acute distress   Posture: Normal    Assessment and Plan     Valentino was seen today for injections.    Diagnoses and all orders for this visit:    Primary osteoarthritis of left knee  -     Large Joint Arthrocentesis: L knee        Administration of viscosupplementation today.  Please see my procedure note for details.    Return in about 1 week (around 3/6/2019) for Injection.    Wenceslao Freire MD  02/27/19  10:34 AM

## 2019-02-27 NOTE — PROGRESS NOTES
Procedure   Large Joint Arthrocentesis: L knee  Date/Time: 2/27/2019 10:29 AM  Consent given by: patient  Site marked: site marked  Timeout: Immediately prior to procedure a time out was called to verify the correct patient, procedure, equipment, support staff and site/side marked as required   Supporting Documentation  Indications: pain   Procedure Details  Location: knee - L knee  Needle size: 22 G  Approach: anterolateral  Medications administered: 30 mg Hyaluronan 30 MG/2ML

## 2019-03-06 ENCOUNTER — CLINICAL SUPPORT (OUTPATIENT)
Dept: ORTHOPEDIC SURGERY | Facility: CLINIC | Age: 79
End: 2019-03-06

## 2019-03-06 DIAGNOSIS — M17.12 PRIMARY OSTEOARTHRITIS OF LEFT KNEE: Primary | ICD-10-CM

## 2019-03-06 PROCEDURE — 20610 DRAIN/INJ JOINT/BURSA W/O US: CPT | Performed by: PHYSICIAN ASSISTANT

## 2019-03-06 NOTE — PROGRESS NOTES
Procedure   Large Joint Arthrocentesis: L knee  Date/Time: 3/6/2019 9:54 AM  Consent given by: patient  Site marked: site marked  Timeout: Immediately prior to procedure a time out was called to verify the correct patient, procedure, equipment, support staff and site/side marked as required   Supporting Documentation  Indications: pain   Procedure Details  Location: knee - L knee  Preparation: Patient was prepped and draped in the usual sterile fashion  Needle size: 22 G  Approach: anterolateral  Medications administered: 30 mg Hyaluronan 30 MG/2ML  Patient tolerance: patient tolerated the procedure well with no immediate complications

## 2019-03-06 NOTE — PROGRESS NOTES
Subjective     Follow-up of the Left Knee (Primary Osteoarthritis of Left Knee; Orthovisc Injection #3)      Valentino Prajapati is a 78 y.o. male.     History of Present Illness   Patient comes in for the third injection of Orthovisc in the left knee.  He has tolerated previous injections well.  No new symptoms.  Allergies   Allergen Reactions   • Penicillins      Current Outpatient Medications on File Prior to Visit   Medication Sig Dispense Refill   • allopurinol (ZYLOPRIM) 100 MG tablet TK 1 T PO QD  1   • fenofibrate micronized (LOFIBRA) 134 MG capsule TK 1 C PO QD  1   • lisinopril (PRINIVIL,ZESTRIL) 20 MG tablet Take 20 mg by mouth Daily.  1   • sildenafil (REVATIO) 20 MG tablet Take 20-40 mg by mouth Take As Directed.     • tamsulosin (FLOMAX) 0.4 MG capsule 24 hr capsule TK 1 C PO QD  1   • warfarin (COUMADIN) 3 MG tablet Take 3 mg by mouth Daily.  0     No current facility-administered medications on file prior to visit.      Social History     Socioeconomic History   • Marital status:      Spouse name: Not on file   • Number of children: Not on file   • Years of education: Not on file   • Highest education level: Not on file   Social Needs   • Financial resource strain: Not on file   • Food insecurity - worry: Not on file   • Food insecurity - inability: Not on file   • Transportation needs - medical: Not on file   • Transportation needs - non-medical: Not on file   Occupational History   • Not on file   Tobacco Use   • Smoking status: Never Smoker   • Smokeless tobacco: Never Used   Substance and Sexual Activity   • Alcohol use: No   • Drug use: No   • Sexual activity: Defer   Other Topics Concern   • Not on file   Social History Narrative   • Not on file     Past Surgical History:   Procedure Laterality Date   • ELBOW PROCEDURE Left    • FRACTURE SURGERY     • VASECTOMY       Family History   Problem Relation Age of Onset   • Deep vein thrombosis Mother    • Bleeding Disorder Mother    • Deep vein  thrombosis Child      Past Medical History:   Diagnosis Date   • Arthritis     left knee   • Deep vein thrombosis (CMS/HCC)    • Essential hypertension 11/7/2016   • JESSICA on CPAP          Review of Systems   Constitutional: Negative.    HENT: Negative.    Eyes: Negative.    Respiratory: Negative.    Cardiovascular: Negative.    Gastrointestinal: Negative.    Endocrine: Negative.    Genitourinary: Negative.    Musculoskeletal: Positive for joint swelling.   Skin: Negative.    Allergic/Immunologic: Negative.    Neurological: Negative.    Hematological: Negative.    Psychiatric/Behavioral: Negative.        The following portions of the patient's history were reviewed and updated as appropriate: allergies, current medications, past family history, past medical history, past social history, past surgical history and problem list.    Ortho Exam      Medical Decision Making    Assessment and Plan/ Diagnosis/Treatment options:   Left knee arthritis undergoing an Orthovisc series.  Plan is to finish the series of the left knee today.  He will return in 6 months to consider repeat series or sooner if needed.    Using sterile technique, the left knee was sterilely prepped with Hibiclens.  Following time out, using a 22 gauge needle the left knee was aspirated and then injected with 2 ml Orthovisc. Approximately 0.5 mm of straw-colored fluid was obtained.  Patient tolerated the procedure well.  No complications.

## 2025-06-13 ENCOUNTER — TRANSCRIBE ORDERS (OUTPATIENT)
Dept: GENERAL RADIOLOGY | Facility: HOSPITAL | Age: 85
End: 2025-06-13
Payer: MEDICARE

## 2025-06-13 ENCOUNTER — HOSPITAL ENCOUNTER (OUTPATIENT)
Dept: GENERAL RADIOLOGY | Facility: HOSPITAL | Age: 85
Discharge: HOME | End: 2025-06-13
Payer: MEDICARE

## 2025-06-13 DIAGNOSIS — M25.572 PAIN AND SWELLING OF LEFT ANKLE: ICD-10-CM

## 2025-06-13 DIAGNOSIS — M25.472 PAIN AND SWELLING OF LEFT ANKLE: Primary | ICD-10-CM

## 2025-06-13 DIAGNOSIS — M25.572 PAIN AND SWELLING OF LEFT ANKLE: Primary | ICD-10-CM

## 2025-06-13 DIAGNOSIS — M25.472 PAIN AND SWELLING OF LEFT ANKLE: ICD-10-CM

## 2025-06-13 PROCEDURE — 73610 X-RAY EXAM OF ANKLE: CPT
